# Patient Record
Sex: FEMALE | Race: WHITE
[De-identification: names, ages, dates, MRNs, and addresses within clinical notes are randomized per-mention and may not be internally consistent; named-entity substitution may affect disease eponyms.]

---

## 2017-02-15 ENCOUNTER — HOSPITAL ENCOUNTER (OUTPATIENT)
Dept: HOSPITAL 75 - PREOP | Age: 27
End: 2017-02-15
Attending: OBSTETRICS & GYNECOLOGY
Payer: COMMERCIAL

## 2017-02-15 VITALS — BODY MASS INDEX: 21.71 KG/M2 | WEIGHT: 115 LBS | HEIGHT: 61 IN

## 2017-02-15 DIAGNOSIS — Z01.818: Primary | ICD-10-CM

## 2017-02-15 DIAGNOSIS — Z30.432: ICD-10-CM

## 2017-02-16 ENCOUNTER — HOSPITAL ENCOUNTER (OUTPATIENT)
Dept: HOSPITAL 75 - SDC | Age: 27
Discharge: HOME | End: 2017-02-16
Attending: OBSTETRICS & GYNECOLOGY
Payer: COMMERCIAL

## 2017-02-16 VITALS — SYSTOLIC BLOOD PRESSURE: 101 MMHG | DIASTOLIC BLOOD PRESSURE: 67 MMHG

## 2017-02-16 VITALS — WEIGHT: 115 LBS | BODY MASS INDEX: 21.71 KG/M2 | HEIGHT: 61 IN

## 2017-02-16 VITALS — DIASTOLIC BLOOD PRESSURE: 69 MMHG | SYSTOLIC BLOOD PRESSURE: 97 MMHG

## 2017-02-16 VITALS — DIASTOLIC BLOOD PRESSURE: 67 MMHG | SYSTOLIC BLOOD PRESSURE: 116 MMHG

## 2017-02-16 DIAGNOSIS — Q51.3: ICD-10-CM

## 2017-02-16 DIAGNOSIS — Z30.432: Primary | ICD-10-CM

## 2017-02-16 LAB
BASOPHILS # BLD AUTO: 0 10^3/UL (ref 0–0.1)
BASOPHILS NFR BLD AUTO: 1 % (ref 0–10)
EOSINOPHIL # BLD AUTO: 0.2 10^3/UL (ref 0–0.3)
EOSINOPHIL NFR BLD AUTO: 3 % (ref 0–10)
ERYTHROCYTE [DISTWIDTH] IN BLOOD BY AUTOMATED COUNT: 12.2 % (ref 10–14.5)
LYMPHOCYTES # BLD AUTO: 1.7 X 10^3 (ref 1–4)
LYMPHOCYTES NFR BLD AUTO: 26 % (ref 12–44)
MCH RBC QN AUTO: 32 PG (ref 25–34)
MCHC RBC AUTO-ENTMCNC: 36 G/DL (ref 32–36)
MCV RBC AUTO: 88 FL (ref 80–99)
MONOCYTES # BLD AUTO: 0.4 X 10^3 (ref 0–1)
MONOCYTES NFR BLD AUTO: 6 % (ref 0–12)
NEUTROPHILS # BLD AUTO: 4.2 X 10^3 (ref 1.8–7.8)
NEUTROPHILS NFR BLD AUTO: 65 % (ref 42–75)
PLATELET # BLD: 317 10^3/UL (ref 130–400)
PMV BLD AUTO: 9.6 FL (ref 7.4–10.4)
RBC # BLD AUTO: 5 10^6/UL (ref 4.35–5.85)
WBC # BLD AUTO: 6.5 10^3/UL (ref 4.3–11)

## 2017-02-16 PROCEDURE — 87081 CULTURE SCREEN ONLY: CPT

## 2017-02-16 PROCEDURE — 86900 BLOOD TYPING SEROLOGIC ABO: CPT

## 2017-02-16 PROCEDURE — 84703 CHORIONIC GONADOTROPIN ASSAY: CPT

## 2017-02-16 PROCEDURE — 36415 COLL VENOUS BLD VENIPUNCTURE: CPT

## 2017-02-16 PROCEDURE — 86850 RBC ANTIBODY SCREEN: CPT

## 2017-02-16 PROCEDURE — 86901 BLOOD TYPING SEROLOGIC RH(D): CPT

## 2017-02-16 PROCEDURE — 85025 COMPLETE CBC W/AUTO DIFF WBC: CPT

## 2017-02-16 NOTE — DISCHARGE INST-WOMEN'S SERVICE
Discharge Inst-Women's Serv


Depart Medication/Instructions


New, Converted or Re-Newed RX:  RX on Chart





Consults/Follow Up


Additional Follow Up:  Yes


Orders/Referrals


Dr. Baldwin in 3 weeks





Activity


Driving Instructions:  You May Drive


NO SMOKING:  NO SMOKING


Nothing Inside Vagina:  No Douching, No [place], No Tampons


Other Activity


nothing in vagina for 2 days





Diet


Discharge Diet:  No Restrictions


Symptoms to Report to :  Bleeding Excessive, Pain Increased, Fever Over 101 

Degrees F, Vaginal Bleeding Increase, Questions/Concerns


For Any Problems or Questions:  Contact Your Physician








YEYO BALDWIN DO Feb 16, 2017 09:06

## 2017-02-16 NOTE — PROGRESS NOTE-PRE OPERATIVE
Pre-Operative Progress Note


H&P Reviewed


The H&P was reviewed, patient examined and no changes noted.


Date H&P Reviewed:  Feb 16, 2017


Time H&P Reviewed:  08:32


Pre-Operative Diagnosis:  Retained IUD, Bicornuate uterus








YEYO BALDWIN DO Feb 16, 2017 8:32 am

## 2017-02-17 NOTE — OPERATIVE REPORT
PROCEDURE PHYSICIAN:   YEYO BALDWIN

 

DATE OF PROCEDURE:  

02/16/2017

 

PREOPERATIVE DIAGNOSIS: 

1.   Retained IUD. 

2.   Bicornate uterus. 

 

POSTOPERATIVE DIAGNOSES:

1.   Retained IUD.

2.   Bicornate uterus. 

 

PROCEDURE:

Removal of IUD under IV sedation. 

 

SURGEON:

Dr. Yeyo Baldwin. 

 

ANESTHESIA:

IV sedation. 

 

ESTIMATED BLOOD LOSS:

Minimal. 

 

URINE OUTPUT:

50 mL

 

FLUID: 

500 mL lactated ringer solution. 

 

FINDINGS:

Retained IUD and a palpable bicornate uterus on bimanual

examination. 

 

INDICATIONS FOR THE PROCEDURE:

This 26-year-old female was coming into my office earlier this

week for an annual well woman update on her care as well as

removal of her IUD as she is planning for pregnancy in the near

future. Upon attempt to remove the IUD, it was very difficult and

uncomfortable for the patient and it was not coming with gentle

pulling on the strings.  Therefore, I discussed with the patient

doing this under IV sedation. The risk of the procedures was

discussed with the patient in detail including risk of

anesthesia. After all of her questions were answered she is

scheduled later this week on Thursday.  In the preoperative area

it was once again reviewed with her mother and  present

the procedure in detail and the possible use of hysteroscopy.

Once the consent was obtained, the patient was taken to the

operating room. 

 

OPERATIVE REPORT IN DETAIL:  

Once in the operating room, IV sedation was found to be adequate.

She was placed in dorsal lithotomy position, prepped and draped

in the normal sterile fashion. A weighted speculum was inserted

in the patient's vagina and right angle retractor is used to

visualize the cervix.  It was grasped at the 12 o'clock position

using long Allis clamp. I perform a paracervical block at 3 and 9

o'clock position using 0.25% Marcaine. A total of 10 mL of are

used, 5 at each injection site. I make injection site hemostatic

using silver nitrate.  I then grasp the IUD strings and with

gentle pressure I am not able to dislodge the IUD therefore, I do

take a narrow Estrella and tease my way into the cervix to find the

tip of the IUD. Once I find the tip of the IUD, easily removed by

grasping the tip of the IUD and pulling it straight out. There is

no active bleeding noted from the uterus afterwards.  All the

instruments were then removed from the patient's vagina. Straight

catheterization is used to drain the bladder, the patient

tolerated the procedure well and was taken to the recovery area

in stable condition.  

 

 

 

Job ID: [id number]

Dictated Date: 02/16/2017 09:28:18 

Transcription Date: 02/17/2017 11:16:01 / yeimi

## 2017-11-03 NOTE — XMS REPORT
Continuity of Care Document

 Created on: 2013



MORAIMA KARIMI AVELINO

External Reference #: [id number]

: 1990

Sex: Female



Demographics







 Address  [address] [place]  [address]

 

 Home Phone  [phone]

 

 Preferred Language  Unknown

 

 Marital Status  Unknown

 

 Tenriism Affiliation  Unknown

 

 Race  Unknown

 

 Ethnic Group  Unknown





Author







 Author  [facility]

 

 Organization  [facility]

 

 Address  Unknown

 

 Phone  Unavailable







Support







 Name  Relationship  Address  Phone

 

 DARIUSZ KARIMI  Next Of Kin  [address] [place]  [address] [phone]



                                            



Insurance Providers

                      





 Payer Name                    Policy Number                    Subscriber Name
                    Relationship                

 

 [employer] [facility] [employer]                    [id number]                    Moraima Karimi AVELINO                    01 Self / Same As Patient                



                                                                    



Advance Directives

                      





 Directive                    Response                    Recorded Date        
        

 

 Advance Directives                    N                    13 9:00pm    
            

 

 Organ Donor                    N                    13 9:00pm           
     



                                                                               
         



Problems

          No Known Problems or Medical conditions.                             
                                       



Family History

                      





 History                    Response                    Recorded Date/Time     
           

 

 Hx Family Cancer                    N                    13 11:43pm     
           

 

 Hx Family Breast Cancer                    N                    13 11:
43pm                

 

 Hx Family Lung Cancer                    N                    13 11:43pm
                

 

 Hx Family Colorectal Cancer                    N                    13 11
:43pm                

 

 Hx Family Cardiac Disorders                    Y                    13 11
:43pm                

 

 Hx Family Myocardial Infarction                    Y MGM                     11:43pm                



                                                                    



Social History

                      





 History                    Response                    Recorded Date/Time     
           

 

 Alcohol Use                    Denies Use                    13 11:41pm 
               

 

 Recreational Drug Use                    N                    13 11:41pm
                

 

 Recent Foreign Travel                    N                    13 11:41pm
                

 

 Recent Infectious Disease Exposure                    N                     11:41pm                

 

 Hospitalization with Isolation                    Denies                     5:47pm                

 

 Sexually Transmitted Disease                    N                    13 
11:41pm                



                                                                               
         



Allergies, Adverse Reactions, Alerts

                      





 Allergen                    Type                    Severity                  
  Reaction                    Last Updated                

 

 No Known Drug Allergies                                                       
                            13                



                                                                               
                   



Medications

                      





 Medication                    Dose                    Units                    
Route                    Sig                    Qty                    Days    
            

 

 Acetaminophen/Hydrocodone Bitart (Lorcet 5/325 Mg)                    1       
             Tab                    PO                    Q4H PRN              
      15                                     

 

 Ferrous Sulfate (Feosol Tab)                    325                    Mg     
               PO                    DAILY                    30               
                      

 

 Benzocaine/Menthol (Dermoplast Spray)                    56                    
Ml                    TP                    UD PRN                    1        
                             

 

 Docusate Sodium (Colace Cap)                    100                    Mg     
               PO                    BID                    20                 
                    

 

 Ibuprofen (Motrin)                    600                    Mg               
     PO                    Q6H                    40                           
          



                                                                               
                                       



Immunizations

                      





 Name                    Given                    Type                

 

 MMR                    13                    A                

 

 Tdap                    13                    A                

 

 influenza, split (incl. purified surface antigen)                    13 
                   A                

 

 MMR                    13                    A                

 

 Tdap                    13                    A                

 

 influenza, split (incl. purified surface antigen)                    13 
                   A                



                                                                               
                                                 



Pregnancy

                      





 Response                    Recorded Date/Time                

 

 Status not known                    Unknown                



                                                                              



Results

          No Known Relevant Diagnostic Tests, Laboratory Data and/or Discharge 
Summary.                                                                    



Encounters

                      





 Encounter                    Location                    Date/Time            
    

 

 Discharged Inpatient                    [facility]                     9:44pm
complains of pain/discomfort

## 2018-10-11 ENCOUNTER — HOSPITAL ENCOUNTER (OUTPATIENT)
Dept: HOSPITAL 75 - RAD | Age: 28
End: 2018-10-11
Attending: OBSTETRICS & GYNECOLOGY
Payer: COMMERCIAL

## 2018-10-11 DIAGNOSIS — Z36.89: Primary | ICD-10-CM

## 2018-10-11 DIAGNOSIS — Z3A.21: ICD-10-CM

## 2018-10-11 PROCEDURE — 76805 OB US >/= 14 WKS SNGL FETUS: CPT

## 2018-10-11 NOTE — DIAGNOSTIC IMAGING REPORT
INDICATION: Fetal survey.



TECHNIQUE: Multiple real-time grayscale images were obtained over

the gravid uterus.



COMPARISON: There are no prior studies available for comparison.



FINDINGS: There is a single live fetus in variable presentation.

Fetal heart motion was noted and a rate of 122 BPM was recorded.

There were no fetal abnormalities identified but the fetal spine

was not well visualized due to fetal lie. I would recommend that

a short-term (4-6 week) followup ultrasound exam be performed for

further study.



The fetal growth parameters are fairly uniform. The placenta is

fundal and along the uterine body on the left. There is no sign

of a previa. The amniotic fluid volume is within normal limits.

The cervix was identified and measures 4.6 cm in length.



IMPRESSION:

1. There is a single live fetus at approximately 21 weeks 2 days

gestation +/-1.5 weeks. The EDC is February 19, 2019.

2. There were no fetal abnormalities identified, although the

fetal spine was not optimally visualized. Recommendations as

above.

3. The fetal growth parameters are fairly uniform.



Biometrical measurements are as follows:

Biparietal 5.02 cm, age 21 weeks 2 days.

Head circumference 18.36 cm, age 20 weeks 6 days.

Abdominal circumference 17.18 cm, age 22 weeks 1 days.

Femur length 3.4 cm, age 20 weeks 5 days.



Sonographic estimate age: 21 weeks 2 days.

Sonographic estimated date of delivery: 2/19/2019.



Estimated Fetal Weight: 421 gm (+/- 62  gm).

LMP percentile: 51%.



Fetal heart rate: 123 beats per minute.



Fetal number: 1 of 1.



Dictated by: 



  Dictated on workstation # [id number]

## 2019-02-06 ENCOUNTER — HOSPITAL ENCOUNTER (OUTPATIENT)
Dept: HOSPITAL 75 - LDRP | Age: 29
End: 2019-02-06
Attending: OBSTETRICS & GYNECOLOGY
Payer: COMMERCIAL

## 2019-02-06 ENCOUNTER — HOSPITAL ENCOUNTER (INPATIENT)
Dept: HOSPITAL 75 - WSO | Age: 29
LOS: 2 days | Discharge: HOME | End: 2019-02-08
Attending: OBSTETRICS & GYNECOLOGY | Admitting: OBSTETRICS & GYNECOLOGY
Payer: COMMERCIAL

## 2019-02-06 VITALS — SYSTOLIC BLOOD PRESSURE: 93 MMHG | DIASTOLIC BLOOD PRESSURE: 50 MMHG

## 2019-02-06 VITALS — DIASTOLIC BLOOD PRESSURE: 58 MMHG | SYSTOLIC BLOOD PRESSURE: 101 MMHG

## 2019-02-06 VITALS — DIASTOLIC BLOOD PRESSURE: 65 MMHG | SYSTOLIC BLOOD PRESSURE: 123 MMHG

## 2019-02-06 VITALS — DIASTOLIC BLOOD PRESSURE: 90 MMHG | SYSTOLIC BLOOD PRESSURE: 143 MMHG

## 2019-02-06 VITALS — WEIGHT: 145.25 LBS | BODY MASS INDEX: 27.42 KG/M2 | HEIGHT: 61 IN

## 2019-02-06 VITALS — SYSTOLIC BLOOD PRESSURE: 106 MMHG | DIASTOLIC BLOOD PRESSURE: 67 MMHG

## 2019-02-06 VITALS — DIASTOLIC BLOOD PRESSURE: 52 MMHG | SYSTOLIC BLOOD PRESSURE: 105 MMHG

## 2019-02-06 VITALS — DIASTOLIC BLOOD PRESSURE: 73 MMHG | SYSTOLIC BLOOD PRESSURE: 136 MMHG

## 2019-02-06 VITALS — SYSTOLIC BLOOD PRESSURE: 124 MMHG | DIASTOLIC BLOOD PRESSURE: 78 MMHG

## 2019-02-06 VITALS — SYSTOLIC BLOOD PRESSURE: 134 MMHG | DIASTOLIC BLOOD PRESSURE: 85 MMHG

## 2019-02-06 VITALS — DIASTOLIC BLOOD PRESSURE: 75 MMHG | SYSTOLIC BLOOD PRESSURE: 110 MMHG

## 2019-02-06 VITALS — DIASTOLIC BLOOD PRESSURE: 57 MMHG | SYSTOLIC BLOOD PRESSURE: 109 MMHG

## 2019-02-06 VITALS — SYSTOLIC BLOOD PRESSURE: 109 MMHG | DIASTOLIC BLOOD PRESSURE: 54 MMHG

## 2019-02-06 VITALS — DIASTOLIC BLOOD PRESSURE: 80 MMHG | SYSTOLIC BLOOD PRESSURE: 133 MMHG

## 2019-02-06 VITALS — SYSTOLIC BLOOD PRESSURE: 117 MMHG | DIASTOLIC BLOOD PRESSURE: 70 MMHG

## 2019-02-06 VITALS
DIASTOLIC BLOOD PRESSURE: 77 MMHG | SYSTOLIC BLOOD PRESSURE: 122 MMHG | HEIGHT: 61 IN | BODY MASS INDEX: 27.19 KG/M2 | WEIGHT: 144.02 LBS

## 2019-02-06 VITALS — DIASTOLIC BLOOD PRESSURE: 65 MMHG | SYSTOLIC BLOOD PRESSURE: 107 MMHG

## 2019-02-06 VITALS — DIASTOLIC BLOOD PRESSURE: 59 MMHG | SYSTOLIC BLOOD PRESSURE: 131 MMHG

## 2019-02-06 VITALS — DIASTOLIC BLOOD PRESSURE: 78 MMHG | SYSTOLIC BLOOD PRESSURE: 122 MMHG

## 2019-02-06 VITALS — DIASTOLIC BLOOD PRESSURE: 59 MMHG | SYSTOLIC BLOOD PRESSURE: 112 MMHG

## 2019-02-06 VITALS — SYSTOLIC BLOOD PRESSURE: 124 MMHG | DIASTOLIC BLOOD PRESSURE: 71 MMHG

## 2019-02-06 VITALS — DIASTOLIC BLOOD PRESSURE: 67 MMHG | SYSTOLIC BLOOD PRESSURE: 112 MMHG

## 2019-02-06 VITALS — DIASTOLIC BLOOD PRESSURE: 60 MMHG | SYSTOLIC BLOOD PRESSURE: 125 MMHG

## 2019-02-06 VITALS — SYSTOLIC BLOOD PRESSURE: 115 MMHG | DIASTOLIC BLOOD PRESSURE: 78 MMHG

## 2019-02-06 DIAGNOSIS — O34.03: Primary | ICD-10-CM

## 2019-02-06 DIAGNOSIS — O47.1: Primary | ICD-10-CM

## 2019-02-06 DIAGNOSIS — O43.123: ICD-10-CM

## 2019-02-06 DIAGNOSIS — Z3A.38: ICD-10-CM

## 2019-02-06 DIAGNOSIS — Z87.42: ICD-10-CM

## 2019-02-06 LAB
AMORPH SED URNS QL MICRO: (no result) /LPF
APTT PPP: YELLOW S
APTT PPP: YELLOW S
BACTERIA #/AREA URNS HPF: (no result) /HPF
BACTERIA #/AREA URNS HPF: (no result) /HPF
BASOPHILS # BLD AUTO: 0 10^3/UL (ref 0–0.1)
BASOPHILS NFR BLD AUTO: 0 % (ref 0–10)
BILIRUB UR QL STRIP: NEGATIVE
BILIRUB UR QL STRIP: NEGATIVE
EOSINOPHIL # BLD AUTO: 0 10^3/UL (ref 0–0.3)
EOSINOPHIL NFR BLD AUTO: 0 % (ref 0–10)
ERYTHROCYTE [DISTWIDTH] IN BLOOD BY AUTOMATED COUNT: 12.6 % (ref 10–14.5)
FIBRINOGEN PPP-MCNC: CLEAR MG/DL
FIBRINOGEN PPP-MCNC: CLEAR MG/DL
GLUCOSE UR STRIP-MCNC: NEGATIVE MG/DL
GLUCOSE UR STRIP-MCNC: NEGATIVE MG/DL
HCT VFR BLD CALC: 38 % (ref 35–52)
HGB BLD-MCNC: 13.2 G/DL (ref 11.5–16)
KETONES UR QL STRIP: NEGATIVE
KETONES UR QL STRIP: NEGATIVE
LEUKOCYTE ESTERASE UR QL STRIP: (no result)
LEUKOCYTE ESTERASE UR QL STRIP: (no result)
LYMPHOCYTES # BLD AUTO: 1.3 X 10^3 (ref 1–4)
LYMPHOCYTES NFR BLD AUTO: 10 % (ref 12–44)
MANUAL DIFFERENTIAL PERFORMED BLD QL: NO
MCH RBC QN AUTO: 30 PG (ref 25–34)
MCHC RBC AUTO-ENTMCNC: 35 G/DL (ref 32–36)
MCV RBC AUTO: 86 FL (ref 80–99)
MONOCYTES # BLD AUTO: 1 X 10^3 (ref 0–1)
MONOCYTES NFR BLD AUTO: 7 % (ref 0–12)
NEUTROPHILS # BLD AUTO: 10.9 X 10^3 (ref 1.8–7.8)
NEUTROPHILS NFR BLD AUTO: 82 % (ref 42–75)
NITRITE UR QL STRIP: NEGATIVE
NITRITE UR QL STRIP: NEGATIVE
PH UR STRIP: 7 [PH] (ref 5–9)
PH UR STRIP: 7 [PH] (ref 5–9)
PLATELET # BLD: 230 10^3/UL (ref 130–400)
PMV BLD AUTO: 10.4 FL (ref 7.4–10.4)
PROT UR QL STRIP: NEGATIVE
PROT UR QL STRIP: NEGATIVE
RBC #/AREA URNS HPF: (no result) /HPF
RBC #/AREA URNS HPF: (no result) /HPF
SP GR UR STRIP: 1.01 (ref 1.02–1.02)
SP GR UR STRIP: 1.01 (ref 1.02–1.02)
SQUAMOUS #/AREA URNS HPF: (no result) /HPF
SQUAMOUS #/AREA URNS HPF: (no result) /HPF
UROBILINOGEN UR-MCNC: NORMAL MG/DL
UROBILINOGEN UR-MCNC: NORMAL MG/DL
WBC # BLD AUTO: 13.2 10^3/UL (ref 4.3–11)
WBC #/AREA URNS HPF: (no result) /HPF
WBC #/AREA URNS HPF: (no result) /HPF

## 2019-02-06 PROCEDURE — 99212 OFFICE O/P EST SF 10 MIN: CPT

## 2019-02-06 PROCEDURE — 85025 COMPLETE CBC W/AUTO DIFF WBC: CPT

## 2019-02-06 PROCEDURE — 81000 URINALYSIS NONAUTO W/SCOPE: CPT

## 2019-02-06 PROCEDURE — 90686 IIV4 VACC NO PRSV 0.5 ML IM: CPT

## 2019-02-06 PROCEDURE — 86850 RBC ANTIBODY SCREEN: CPT

## 2019-02-06 PROCEDURE — 36415 COLL VENOUS BLD VENIPUNCTURE: CPT

## 2019-02-06 PROCEDURE — 87088 URINE BACTERIA CULTURE: CPT

## 2019-02-06 PROCEDURE — 99213 OFFICE O/P EST LOW 20 MIN: CPT

## 2019-02-06 PROCEDURE — 86900 BLOOD TYPING SEROLOGIC ABO: CPT

## 2019-02-06 PROCEDURE — 86901 BLOOD TYPING SEROLOGIC RH(D): CPT

## 2019-02-06 PROCEDURE — 90715 TDAP VACCINE 7 YRS/> IM: CPT

## 2019-02-06 PROCEDURE — 90471 IMMUNIZATION ADMIN: CPT

## 2019-02-06 RX ADMIN — Medication SCH MLS/HR: at 19:06

## 2019-02-06 RX ADMIN — IBUPROFEN SCH MG: 600 TABLET ORAL at 21:37

## 2019-02-06 RX ADMIN — DOCUSATE SODIUM SCH MG: 100 CAPSULE ORAL at 21:37

## 2019-02-06 RX ADMIN — Medication SCH MLS/HR: at 18:32

## 2019-02-06 NOTE — OB LABOR & DELIVERY RECORD
L&D History


Date of Service


Date of Service:  2019





History


Expected Date of Delivery:  2019


Gestational Age in Weeks:  38


Hx :  2


Hx Para:  1





Pregnancy Complications


Prenatal Events:  Routine Prenatal care


Operative Indications (Ces[place]:  N/A-Vaginal Delivery


Intrapartal Events:  None





L&D Stage1


Stage One


Onset of Labor - Date:  2019





Monitors and Tracing


Fetal Monitor Mode:  External


Fetal Monitor Accelerations:  Uniform


Fetal Monitor Decelerations:  Variable


Fetal Station:  -1


Long Term Variability:  Average (6-10)


Short Term Variability:  Present


Fetal Presentation:  Vertex





Rupture of Membranes


Spontaneous Ruture of Membrane:  No


Amniotic Membrane Rupture Time:  15:30


Amniotic Membrane Fluid Desc.:  Clear


Vaginal Bleeding Description:  Normal Show





Progress/Notes


Patient received a spinal dose due to 8 cm dilatation after AROM was performed.

  She progressed from there without further augmentation to complete and +1 

station.





L&D Stage2


Stage Two


Stage II Date:  2019





Monitors and Tracing


Fetal Monitor Mode:  External


Fetal Monitor Accelerations:  None


Fetal Monitor Decelerations:  Variable


Long Term Variability:  Average (6-10)


Short Term Variability:  Present


Fetal Position:  Right Occiput Anterior


Fetal Presentation:  Vertex





Signs of Fetal Distress by FHT


Signs of Distress


repetitive variable decels in to the 60s





Cord Descript/Complications


Fetal Cord Vessel Description:  3 Vessels





Delivery Type


Infant Delivery Method:  Low Vacuum Extraction


Anterior Shoulder:  Right





Episiotomy/Perineal Laceration


Laceraction(s)/Extensions:  Yes


Episiotomy Description:  Right Mediolateral


Sutures Used:  Vicryl


Degree (describe repair)


Due to maternal exhaustion and inability to progress vertex past +2-+3 station, 

kiwi vacuum extractor used for low extraction.  Perineum infiltrated using .25% 

marcaine with epi, and RML performed.  Suction cup placed down mid sagital 

suture line, and with next maternal ctx, pressure increased to 500 mmHG, and 

with maternal push infant head extended for delivery.  Nuchal cord reduced x 1.

  Anterior/ posterior shoulders delivered and the remainder the infant is 

easily delivered and placed on maternal abdomen.  RML repaired in usual fashion 

using 3-0 and 2-0 vicryl suture.





Condition of Infant


Delivery


1 minute Apgar Comment:  


8


5 minute Apgar Comment:  


9


Notes


live female infant weight 5lbs 6 oz.





Condition of Infant


Condition of Infant:  Living


Exam:  No Observed Abnormalities





Resuscitation


Resuscitation:  N/A - Spontaneous Resp





L&D Stage3


Pictocin


Suspected villamentous cord insertion due to gentle traction on the cord 

evulsing the cord.  Placenta manually extracted 30 mu wide open of pitocin given

, and 0.2 mg of Methergine given IM for mild uterine atony.





Placenta Delivery


Placenta Delivery:  Manual





Delivery Summary


Summary


Estimated blood loss (mL):  350


Attending at delivery:


Yeyo Baldwin DO





Condition of Delivery


Examined:  Cervix Examined, Uterus Explored


Post Partum Hemorrhage:  No


Condition of Mother


stable


Condition of Infant (s)


stable











YEYO BALDWIN DO 2019 18:54

## 2019-02-06 NOTE — HISTORY & PHYSICAL-OB
OB - Chief Complaint & HPI


Date/Time


Date of Admission:


Date of Admission:


Date seen by a Provider:  2019


Time Seen by a Provider:  15:00





Chief Complaint/History


OB-Reason for Admission/Chief:  Onset of Labor


Hx :  2


Hx Para:  1


Expected Date of Delivery:  2019


Gestational Age in Weeks:  38


Gestational Age in Days:  1


Admission Nurse Assessment Rev:  Yes


History of Labs


O pos


Antibody neg


RI 


RPR NR


HBsAg NR


HIV NR


GC neg


GBS neg





Allergies and Home Medications


Allergies


Coded Allergies:  


     No Known Drug Allergies (Unverified , 13)





Home Medications


Prenatal No.137/Iron/Folic Acd 1 Each Tablet, 1 EACH PO DAILY, (Reported)





Patient Home Medication List


Home Medication List Reviewed:  Yes





OB - History


Hx of Present Pregnancy


Prenatal Care:  Yes


Ultrasounds:  Normal mid trimester US


Obstetrical Complications:  None


Medical Complications:  None





Obstetrical History


Hx Pregnancy Termination:  No


Hx Multiple Gestation:  No


Hx Stillbirth:  No


Hx Pregnancy Complication:  No


Hx Pregnancy Induced Hypertens:  No


Hx Maternal Gestational Diabet:  No





Delivery History


Hx Dystocia:  No


Hx Large For Gestational Age I:  No


Hx Small for Gestational Age I:  No


Hx  Section:  No


Hx Vaginal Delivery Post C-Sec:  No


Hx Blood Disorders:  No


Adverse Rxn to Tranfusion:  No





Patient Past Medical History





n/a





Social History/Family History


Sexually Transmitted Disease:  No





Immunizations


Hepatitis A:  No


Hepatitis B:  No


Tetanus Booster (TDap):  Unknown





OB - Admission Exam


Physical Exam


HEENT:  NCAT


Heart:  Rhythm Normal


Lungs:  Clear


Abdomen:  Gravid


Extremities:  Normal


Reflexes:  Normal


Cervical Dilatation:  6cm


Effacement:  75%


Station:  -1


Membranes:  Intact


Fetal Heart Rate:  130's


Accelerations:  Accelerations Present


Decelerations:  Variable Decelerations


Short Term Variability:  Present


Long Term Variability:  Average (6-25)


Contractions on Admission:  < 5 Minutes Apart


Intensity:  Firm


Labs





Laboratory Tests








Test


 19


15:03 Range/Units


 


 


White Blood Count


 13.2 H


 4.3-11.0


10^3/uL


 


Red Blood Count


 4.34 L


 4.35-5.85


10^6/uL


 


Hemoglobin 13.2  11.5-16.0  G/DL


 


Hematocrit 38  35-52  %


 


Mean Corpuscular Volume 86  80-99  FL


 


Mean Corpuscular Hemoglobin 30  25-34  PG


 


Mean Corpuscular Hemoglobin


Concent 35 


 32-36  G/DL





 


Red Cell Distribution Width 12.6  10.0-14.5  %


 


Platelet Count


 230 


 130-400


10^3/uL


 


Mean Platelet Volume 10.4  7.4-10.4  FL


 


Neutrophils (%) (Auto) 82 H 42-75  %


 


Lymphocytes (%) (Auto) 10 L 12-44  %


 


Monocytes (%) (Auto) 7  0-12  %


 


Eosinophils (%) (Auto) 0  0-10  %


 


Basophils (%) (Auto) 0  0-10  %


 


Neutrophils # (Auto) 10.9 H 1.8-7.8  X 10^3


 


Lymphocytes # (Auto) 1.3  1.0-4.0  X 10^3


 


Monocytes # (Auto) 1.0  0.0-1.0  X 10^3


 


Eosinophils # (Auto)


 0.0 


 0.0-0.3


10^3/uL


 


Basophils # (Auto)


 0.0 


 0.0-0.1


10^3/uL











OB - Assessment/Plan/Diagnosis


Assessment


Assessment:  active labor


Admission Dx


27 yo  @ 38 weeks


Active labor


GBS neg


Bicornuate uterus


Admission Status:  Inpatient Order (span 2 midnights)


Reason for Inpatient Admission:  


Active labor





Plan


Plan:  Other (AROM, and analgesia)











YEYO BALDWIN DO 2019 15:34

## 2019-02-06 NOTE — NUR
#20g IV to Rt.wrist x1 attempt by this RN.  site patent, secured with opsite.  admission 
labs collected prior to IVF's infusing.  pt breathing with ctx's.

## 2019-02-06 NOTE — DISCHARGE INST-WOMEN'S SERVICE
Discharge Inst-Women's Serv


Depart Medication/Instructions


New, Converted or Re-Newed RX:  RX on Chart


Final Diagnosis


PPD 2 VAVD





Consults/Follow Up


Additional Follow Up:  Yes


Orders/Referrals


Dr. Baldwin in 6 weeks





Activity


Activity:  Activity as Tolerated


Driving Instructions:  No Driving for 1 Week


NO SMOKING:  NO SMOKING


Nothing Inside Vagina:  No Douching, No [place], No Tampons





Diet


Discharge Diet:  No Restrictions


Symptoms to Report to :  Bleeding Excessive, Pain Increased, Fever Over 101 

Degrees F, Vaginal Bleeding Increase, Questions/Concerns


For Any Problems or Questions:  Contact Your Physician











YEYO BALDWIN DO Feb 6, 2019 18:56

## 2019-02-06 NOTE — NUR
MORAIMA KARIMI presented to unit via ambulation from home, accompanied by , with 
c/o CONTRACTIONS. MORAIMA KARIMI weighed, gowned, voided, and to bed.  EFHM and TOCO 
applied, VS taken.  MORAIMA KARIMI oriented to bed controls, call light, TV, heat, and A/C 
controls.

## 2019-02-06 NOTE — NUR
here. monitor tracing reviewed. SVE 8cm.  AROM.  moderate amount clear fluid 
noted. narciso-care offered.

## 2019-02-06 NOTE — NUR
Pt. discharged home on labor precautions and instructed when she should return. Discharge 
instructions given and explained. Pt. verbalized understanding, signature to verify. Pt. 
ambulated off unit with all personal belongings in possession, accompanied by .

## 2019-02-07 VITALS — SYSTOLIC BLOOD PRESSURE: 103 MMHG | DIASTOLIC BLOOD PRESSURE: 62 MMHG

## 2019-02-07 VITALS — SYSTOLIC BLOOD PRESSURE: 106 MMHG | DIASTOLIC BLOOD PRESSURE: 57 MMHG

## 2019-02-07 VITALS — DIASTOLIC BLOOD PRESSURE: 63 MMHG | SYSTOLIC BLOOD PRESSURE: 99 MMHG

## 2019-02-07 VITALS — SYSTOLIC BLOOD PRESSURE: 102 MMHG | DIASTOLIC BLOOD PRESSURE: 62 MMHG

## 2019-02-07 VITALS — DIASTOLIC BLOOD PRESSURE: 70 MMHG | SYSTOLIC BLOOD PRESSURE: 102 MMHG

## 2019-02-07 VITALS — SYSTOLIC BLOOD PRESSURE: 101 MMHG | DIASTOLIC BLOOD PRESSURE: 60 MMHG

## 2019-02-07 LAB
BASOPHILS # BLD AUTO: 0 10^3/UL (ref 0–0.1)
BASOPHILS NFR BLD AUTO: 0 % (ref 0–10)
EOSINOPHIL # BLD AUTO: 0 10^3/UL (ref 0–0.3)
EOSINOPHIL NFR BLD AUTO: 0 % (ref 0–10)
ERYTHROCYTE [DISTWIDTH] IN BLOOD BY AUTOMATED COUNT: 12.7 % (ref 10–14.5)
HCT VFR BLD CALC: 34 % (ref 35–52)
HGB BLD-MCNC: 11.8 G/DL (ref 11.5–16)
LYMPHOCYTES # BLD AUTO: 1.7 X 10^3 (ref 1–4)
LYMPHOCYTES NFR BLD AUTO: 10 % (ref 12–44)
MANUAL DIFFERENTIAL PERFORMED BLD QL: NO
MCH RBC QN AUTO: 30 PG (ref 25–34)
MCHC RBC AUTO-ENTMCNC: 35 G/DL (ref 32–36)
MCV RBC AUTO: 88 FL (ref 80–99)
MONOCYTES # BLD AUTO: 1.5 X 10^3 (ref 0–1)
MONOCYTES NFR BLD AUTO: 9 % (ref 0–12)
NEUTROPHILS # BLD AUTO: 14.3 X 10^3 (ref 1.8–7.8)
NEUTROPHILS NFR BLD AUTO: 81 % (ref 42–75)
PLATELET # BLD: 248 10^3/UL (ref 130–400)
PMV BLD AUTO: 10.4 FL (ref 7.4–10.4)
WBC # BLD AUTO: 17.6 10^3/UL (ref 4.3–11)

## 2019-02-07 RX ADMIN — HYDROCODONE BITARTRATE AND ACETAMINOPHEN PRN TAB: 5; 325 TABLET ORAL at 02:52

## 2019-02-07 RX ADMIN — DOCUSATE SODIUM SCH MG: 100 CAPSULE ORAL at 20:41

## 2019-02-07 RX ADMIN — FERROUS SULFATE TAB 325 MG (65 MG ELEMENTAL FE) SCH MG: 325 (65 FE) TAB at 08:11

## 2019-02-07 RX ADMIN — HYDROCODONE BITARTRATE AND ACETAMINOPHEN PRN TAB: 5; 325 TABLET ORAL at 15:00

## 2019-02-07 RX ADMIN — IBUPROFEN SCH MG: 600 TABLET ORAL at 18:07

## 2019-02-07 RX ADMIN — DOCUSATE SODIUM SCH MG: 100 CAPSULE ORAL at 08:11

## 2019-02-07 RX ADMIN — IBUPROFEN SCH MG: 600 TABLET ORAL at 10:14

## 2019-02-07 RX ADMIN — IBUPROFEN SCH MG: 600 TABLET ORAL at 04:51

## 2019-02-07 RX ADMIN — VITAMIN A ACETATE, .BETA.-CAROTENE, ASCORBIC ACID, CHOLECALCIFEROL, .ALPHA.-TOCOPHEROL ACETATE, DL-, THIAMINE MONONITRATE, RIBOFLAVIN, NIACINAMIDE, PYRIDOXINE HYDROCHLORIDE, FOLIC ACID, CYANOCOBALAMIN, CALCIUM CARBONATE, FERROUS FUMARATE, ZINC OXIDE, AND CUPRIC OXIDE SCH EA: 2000; 2000; 120; 400; 22; 1.84; 3; 20; 10; 1; 12; 200; 27; 25; 2 TABLET ORAL at 08:12

## 2019-02-07 RX ADMIN — IBUPROFEN SCH MG: 600 TABLET ORAL at 16:13

## 2019-02-07 RX ADMIN — HYDROCODONE BITARTRATE AND ACETAMINOPHEN PRN TAB: 5; 325 TABLET ORAL at 08:11

## 2019-02-07 NOTE — NUR
THIS RN INTRODUCES SELF TO PT AND SO AT THIS TIME. PT ON HANDS & KNEES IN BED, TRYING TO GET 
COMFORTBALE SHE STATES. CO PAIN IN BOTTOM. THIS RN SUGGESTS USE OF DERMOPLAST SPRAY, TUCKS 
PADS, &/OR ICE. PT VERBALIZES UNDERSTANDING. RATING PAIN 7/10, REQUESTS PAIN MEDS FOR 
RELIEF. THIS RN ADMINS MEDS. PHYSICAL ASSESSMENT COMPLETE, VSS. PT DENIES NEEDS OR CONCERNS 
AT THIS TIME. CALL LIGHT WITHIN REACH.

## 2019-02-07 NOTE — POSTPARTUM PROGRESS NOTE
Postpartum Note


Postpartum Note


Postpartum Day # 1





Subjective:


Patient is without complaints. Ambulating, voiding. Tolerating a regular diet 

without nausea or vomiting. Normal lochia. Pain is well controlled with oral 

pain medications. 





Objective:








Physical Exam:


General - Alert and oriented, no apparent distress


Abdomen - Soft, appropriately tender to palpation, non-distended, fundus firm 

at umbilicus


Extremities - no edema, negative Destinee's bilaterally 








Assessment:


VAVD  











Plan:


Routine postpartum care.


Encourage breast feeding.


Encourage ambulation.


Ferrous sulfate supplementation.


Plan for discharge tomorrow





Vitals - Labs


Vital Signs - I&O





Vital Signs








  Date Time  Temp Pulse Resp B/P (MAP) Pulse Ox O2 Delivery O2 Flow Rate FiO2


 


2/7/19 08:00 97.6 78 16 102/70 (81) 98 Room Air  


 


2/7/19 04:14 98.2 76 20 99/63 (75) 96 Room Air  


 


2/7/19 00:00 98.2 71 20 106/57 (73)  Room Air  


 


2/6/19 21:00 97.9 68 20 123/65 (84)  Room Air  


 


2/6/19 20:23  76 20 117/70 (86)  Room Air  


 


2/6/19 19:53  75 20 143/90 (107)  Room Air  


 


2/6/19 19:23   20 112/67 (82)  Room Air  


 


2/6/19 19:07  80 20 101/58 (72)  Room Air  


 


2/6/19 18:55  88 20 105/52 (69)  Room Air  


 


2/6/19 18:40  78 20 112/59 (76)  Room Air  


 


2/6/19 18:25  87 20 109/54 (72)  Room Air  


 


2/6/19 18:10  102 20 125/60 (81)  Room Air  


 


2/6/19 17:55  80 20 124/78 (93)  Room Air  


 


2/6/19 17:40 97.1 81 20 115/78 (90)  Room Air  


 


2/6/19 17:25  69 20 109/57 (74)  Room Air  


 


2/6/19 17:10  68 20 93/50 (64)  Room Air  


 


2/6/19 16:55  86 20 107/65 (79)  Room Air  


 


2/6/19 16:40  77 20 106/67 (80)  Room Air  


 


2/6/19 16:20  75 20 131/59 (83)  Room Air  


 


2/6/19 16:05  73 18 136/73 (94) 98 Room Air  


 


2/6/19 16:00  67 20 122/78 (93) 99 Room Air  


 


2/6/19 15:52  71 18 134/85 (101) 99 Room Air  


 


2/6/19 15:47  71 18 133/80 (97) 100 Room Air  


 


2/6/19 15:42  72 18 124/71 (88) 98 Room Air  


 


2/6/19 14:43 97.7 81 20 122/77 (92)  Room Air  











Labs


Laboratory Tests


2/6/19 15:03: 


White Blood Count 13.2H, Red Blood Count 4.34L, Hemoglobin 13.2, Hematocrit 38, 

Mean Corpuscular Volume 86, Mean Corpuscular Hemoglobin 30, Mean Corpuscular 

Hemoglobin Concent 35, Red Cell Distribution Width 12.6, Platelet Count 230, 

Mean Platelet Volume 10.4, Neutrophils (%) (Auto) 82H, Lymphocytes (%) (Auto) 

10L, Monocytes (%) (Auto) 7, Eosinophils (%) (Auto) 0, Basophils (%) (Auto) 0, 

Neutrophils # (Auto) 10.9H, Lymphocytes # (Auto) 1.3, Monocytes # (Auto) 1.0, 

Eosinophils # (Auto) 0.0, Basophils # (Auto) 0.0


2/6/19 15:05: 


Urine Color YELLOW, Urine Clarity CLEAR, Urine pH 7, Urine Specific Gravity 

1.010L, Urine Protein NEGATIVE, Urine Glucose (UA) NEGATIVE, Urine Ketones 

NEGATIVE, Urine Nitrite NEGATIVE, Urine Bilirubin NEGATIVE, Urine Urobilinogen 

NORMAL, Urine Leukocyte Esterase 2+H, Urine RBC (Auto) 1+H, Urine RBC RARE, 

Urine WBC 5-10H, Urine Squamous Epithelial Cells 5-10, Urine Crystals PRESENTH, 

Urine Amorphous Sediment FEW SASHA PHOSPHATEH, Urine Bacteria FEWH, Urine Casts 

NONE, Urine Mucus SMALLH, Urine Culture Indicated YES


2/7/19 06:00: 


White Blood Count 17.6H, Red Blood Count 3.90L, Hemoglobin 11.8, Hematocrit 34L

, Mean Corpuscular Volume 88, Mean Corpuscular Hemoglobin 30, Mean Corpuscular 

Hemoglobin Concent 35, Red Cell Distribution Width 12.7, Platelet Count 248, 

Mean Platelet Volume 10.4, Neutrophils (%) (Auto) 81H, Lymphocytes (%) (Auto) 

10L, Monocytes (%) (Auto) 9, Eosinophils (%) (Auto) 0, Basophils (%) (Auto) 0, 

Neutrophils # (Auto) 14.3H, Lymphocytes # (Auto) 1.7, Monocytes # (Auto) 1.5H, 

Eosinophils # (Auto) 0.0, Basophils # (Auto) 0.0











YEYO BALDWIN DO Feb 7, 2019 09:04

## 2019-02-07 NOTE — ANESTHESIA-REGIONAL POST-OP
Physical Therapy Daily Progress Note    Subjective   My knee feels better. My leg was sore the day after leg press at last visit.    Objective   See Exercise, Manual, and Modality Logs for complete treatment.       Assessment/Plan  Strength continues to progress. Demonstrated improved ability to avoid knee valgus on steps. Plan to re-assess at next visit. Progress per POC.                 Manual Therapy:    0     mins  31798;  Therapeutic Exercise:    33     mins  80510;     Neuromuscular Homa:    0    mins  67870;    Therapeutic Activity:     10     mins  66506;     Gait Trainin     mins  36664;     Ultrasound:     0     mins  88767;    Work Hardening           0      mins 94954  Iontophoresis               0   mins 92910    Timed Treatment:   43   mins   Total Treatment:     48   mins    Jillian George, PT  Physical Therapist   [facility]


Patient Condition


Mental Status:  Alert, Oriented x3


Circulation:  Same as Pre-Op


Headache:  Absent


Sensation:  Full Recovery


Motor Block:  Absent





Post Op Complications


Complications


None





Follow Up Care/Instructions


Patient Instructions


None needed.





Anesthesia/Patient Condition


Patient is doing well, no complaints, stable vital signs, no apparent adverse 

anesthesia problems.   


No complications reported per nursing.











AMANDEEP ZEE CRNA Feb 7, 2019 10:16

## 2019-02-08 VITALS — SYSTOLIC BLOOD PRESSURE: 89 MMHG | DIASTOLIC BLOOD PRESSURE: 55 MMHG

## 2019-02-08 VITALS — DIASTOLIC BLOOD PRESSURE: 79 MMHG | SYSTOLIC BLOOD PRESSURE: 136 MMHG

## 2019-02-08 VITALS — SYSTOLIC BLOOD PRESSURE: 106 MMHG | DIASTOLIC BLOOD PRESSURE: 68 MMHG

## 2019-02-08 VITALS — DIASTOLIC BLOOD PRESSURE: 65 MMHG | SYSTOLIC BLOOD PRESSURE: 100 MMHG

## 2019-02-08 RX ADMIN — IBUPROFEN SCH MG: 600 TABLET ORAL at 12:24

## 2019-02-08 RX ADMIN — IBUPROFEN SCH MG: 600 TABLET ORAL at 00:10

## 2019-02-08 RX ADMIN — VITAMIN A ACETATE, .BETA.-CAROTENE, ASCORBIC ACID, CHOLECALCIFEROL, .ALPHA.-TOCOPHEROL ACETATE, DL-, THIAMINE MONONITRATE, RIBOFLAVIN, NIACINAMIDE, PYRIDOXINE HYDROCHLORIDE, FOLIC ACID, CYANOCOBALAMIN, CALCIUM CARBONATE, FERROUS FUMARATE, ZINC OXIDE, AND CUPRIC OXIDE SCH EA: 2000; 2000; 120; 400; 22; 1.84; 3; 20; 10; 1; 12; 200; 27; 25; 2 TABLET ORAL at 09:29

## 2019-02-08 RX ADMIN — DOCUSATE SODIUM SCH MG: 100 CAPSULE ORAL at 09:29

## 2019-02-08 RX ADMIN — FERROUS SULFATE TAB 325 MG (65 MG ELEMENTAL FE) SCH MG: 325 (65 FE) TAB at 09:29

## 2019-02-08 RX ADMIN — IBUPROFEN SCH MG: 600 TABLET ORAL at 05:56

## 2019-02-08 NOTE — NUR
Discharge instructions explained, signed and copy to patient.  pt verbalized understanding 
of instructions and denied questions.

prescriptions given earlier today to get filled in case pt becomes room in parent.

## 2019-02-08 NOTE — POSTPARTUM PROGRESS NOTE
Postpartum Note


Postpartum Note


Postpartum Day # 2





Subjective:


Patient is without complaints. Ambulating, voiding. Tolerating a regular diet 

without nausea or vomiting. Normal lochia. Pain is well controlled with oral 

pain medications





Objective:








Physical Exam:


General - Alert and oriented, no apparent distress


Abdomen - Soft, appropriately tender to palpation, non-distended, fundus firm 

at umbilicus


Extremities - no edema, negative Destinee's bilaterally 








Assessment:


PPD 2 VAVD











Plan:


Routine postpartum care.


Encourage breast feeding.


Encourage ambulation.


Ferrous sulfate supplementation.


Plan for discharge today





Vitals - Labs


Vital Signs - I&O





Vital Signs








  Date Time  Temp Pulse Resp B/P (MAP) Pulse Ox O2 Delivery O2 Flow Rate FiO2


 


2/8/19 05:54 97.9 82 16 89/55 (66) 97 Room Air  


 


2/8/19 00:00 98.6 78 18 106/68 (81)  Room Air  


 


2/7/19 20:00 98.2 82 16 102/62 (75)  Room Air  


 


2/7/19 16:00 97.7 67 16 103/62 (76) 99 Room Air  


 


2/7/19 12:00 98.0 73 16 101/60 (74) 96 Room Air  


 


2/7/19 08:00 97.6 78 16 102/70 (81) 98 Room Air  











Labs





Microbiology


2/6/19 Urine Culture - Final, Complete


         NO GROWTH











YEYO BALDWIN DO Feb 8, 2019 07:33

## 2019-06-21 ENCOUNTER — HOSPITAL ENCOUNTER (OUTPATIENT)
Dept: HOSPITAL 75 - RAD | Age: 29
End: 2019-06-21
Attending: OBSTETRICS & GYNECOLOGY
Payer: COMMERCIAL

## 2019-06-21 DIAGNOSIS — N89.8: Primary | ICD-10-CM

## 2019-06-21 DIAGNOSIS — Q51.3: ICD-10-CM

## 2019-06-21 DIAGNOSIS — N94.10: ICD-10-CM

## 2019-06-21 PROCEDURE — 76856 US EXAM PELVIC COMPLETE: CPT

## 2019-06-21 PROCEDURE — 76830 TRANSVAGINAL US NON-OB: CPT

## 2019-06-21 NOTE — DIAGNOSTIC IMAGING REPORT
PROCEDURE: US Non-ob pelvis comp/trans.



TECHNIQUE:  Multiple realtime grayscale images were obtained of

the pelvis in various projections endovaginally.



Transabdominal imaging was also performed.



INDICATION: Postpartum for four months with dyspareunia.



FINDINGS: The uterus measures 8.3 x 7.9 x 3.1 cm. Patient appears

to have a bicornuate uterus. Left endometrium is 10 mm in

thickness. Right endometrium is 9 mm in thickness. No myometrial

mass is detected. There is a cystic mass in the region of the

vaginal canal measuring 3.1 x 1.9 x 2.2 cm. Right ovary measures

4.0 x 2.4 x 2.1 cm and left ovary measures 3.6 x 1.8 x 1.8 cm.

The ovaries contain small follicles. There is blood flow

bilaterally. No adnexal mass or free fluid is seen.



IMPRESSION:

1. Bicornuate uterus.

2. 3.1 cm vaginal wall cyst. No other significant abnormality is

detected.



Dictated by: 



  Dictated on workstation # [id number]

## 2019-07-31 ENCOUNTER — HOSPITAL ENCOUNTER (OUTPATIENT)
Dept: HOSPITAL 75 - RAD | Age: 29
End: 2019-07-31
Attending: OBSTETRICS & GYNECOLOGY
Payer: COMMERCIAL

## 2019-07-31 DIAGNOSIS — N89.8: Primary | ICD-10-CM

## 2019-07-31 PROCEDURE — 74455 X-RAY URETHRA/BLADDER: CPT

## 2019-07-31 NOTE — DIAGNOSTIC IMAGING REPORT
Indication: Vaginal wall mass. The study is performed to evaluate

for urethral diverticulum.



A Gore catheter was inserted into the patient's bladder. Balloon

was inflated. Iodinated contrast mixed with saline was injected

in a retrograde fashion. Once adequate bladder distention was

achieved, a Gore catheter was removed. Patient then voided on

the table during rapid spot film acquisition AP and oblique

position.



A preliminary radiograph over the pelvis is unremarkable. The

bladder has a smooth contour. There appears to be a smooth walled

impression upon the bladder base which may be secondary to the

known vaginal wall mass. No abnormal accumulations of contrast

are seen to suggest urethral diverticulum. Urethra is

unremarkable.



Impression: No evidence of urethral diverticulum. There is

impression upon the bladder base by smooth-walled mass,

correlating with the cystic mass noted on pelvic ultrasound

within the vaginal wall.



Dictated by: 



  Dictated on workstation # [id number]

## 2019-08-06 ENCOUNTER — HOSPITAL ENCOUNTER (OUTPATIENT)
Dept: HOSPITAL 75 - PREOP | Age: 29
Discharge: HOME | End: 2019-08-06
Attending: OBSTETRICS & GYNECOLOGY
Payer: COMMERCIAL

## 2019-08-06 VITALS — HEIGHT: 61 IN | BODY MASS INDEX: 23.22 KG/M2 | WEIGHT: 123 LBS

## 2019-08-06 DIAGNOSIS — Z01.818: Primary | ICD-10-CM

## 2019-08-08 ENCOUNTER — HOSPITAL ENCOUNTER (OUTPATIENT)
Dept: HOSPITAL 75 - SDC | Age: 29
LOS: 1 days | Discharge: HOME | End: 2019-08-09
Attending: OBSTETRICS & GYNECOLOGY
Payer: COMMERCIAL

## 2019-08-08 VITALS — SYSTOLIC BLOOD PRESSURE: 82 MMHG | DIASTOLIC BLOOD PRESSURE: 60 MMHG

## 2019-08-08 VITALS — DIASTOLIC BLOOD PRESSURE: 62 MMHG | SYSTOLIC BLOOD PRESSURE: 112 MMHG

## 2019-08-08 VITALS — DIASTOLIC BLOOD PRESSURE: 57 MMHG | SYSTOLIC BLOOD PRESSURE: 97 MMHG

## 2019-08-08 VITALS — SYSTOLIC BLOOD PRESSURE: 83 MMHG | DIASTOLIC BLOOD PRESSURE: 54 MMHG

## 2019-08-08 VITALS — DIASTOLIC BLOOD PRESSURE: 57 MMHG | SYSTOLIC BLOOD PRESSURE: 93 MMHG

## 2019-08-08 VITALS — DIASTOLIC BLOOD PRESSURE: 58 MMHG | SYSTOLIC BLOOD PRESSURE: 88 MMHG

## 2019-08-08 VITALS — DIASTOLIC BLOOD PRESSURE: 48 MMHG | SYSTOLIC BLOOD PRESSURE: 80 MMHG

## 2019-08-08 VITALS — DIASTOLIC BLOOD PRESSURE: 53 MMHG | SYSTOLIC BLOOD PRESSURE: 90 MMHG

## 2019-08-08 VITALS — SYSTOLIC BLOOD PRESSURE: 88 MMHG | DIASTOLIC BLOOD PRESSURE: 60 MMHG

## 2019-08-08 VITALS — SYSTOLIC BLOOD PRESSURE: 88 MMHG | DIASTOLIC BLOOD PRESSURE: 56 MMHG

## 2019-08-08 VITALS — DIASTOLIC BLOOD PRESSURE: 70 MMHG | SYSTOLIC BLOOD PRESSURE: 103 MMHG

## 2019-08-08 VITALS — SYSTOLIC BLOOD PRESSURE: 90 MMHG | DIASTOLIC BLOOD PRESSURE: 47 MMHG

## 2019-08-08 VITALS — SYSTOLIC BLOOD PRESSURE: 85 MMHG | DIASTOLIC BLOOD PRESSURE: 57 MMHG

## 2019-08-08 VITALS — DIASTOLIC BLOOD PRESSURE: 57 MMHG | SYSTOLIC BLOOD PRESSURE: 88 MMHG

## 2019-08-08 VITALS — SYSTOLIC BLOOD PRESSURE: 85 MMHG | DIASTOLIC BLOOD PRESSURE: 56 MMHG

## 2019-08-08 VITALS — HEIGHT: 61 IN | BODY MASS INDEX: 23.22 KG/M2 | WEIGHT: 123 LBS

## 2019-08-08 VITALS — SYSTOLIC BLOOD PRESSURE: 93 MMHG | DIASTOLIC BLOOD PRESSURE: 58 MMHG

## 2019-08-08 VITALS — SYSTOLIC BLOOD PRESSURE: 98 MMHG | DIASTOLIC BLOOD PRESSURE: 54 MMHG

## 2019-08-08 VITALS — DIASTOLIC BLOOD PRESSURE: 46 MMHG | SYSTOLIC BLOOD PRESSURE: 85 MMHG

## 2019-08-08 VITALS — DIASTOLIC BLOOD PRESSURE: 62 MMHG | SYSTOLIC BLOOD PRESSURE: 99 MMHG

## 2019-08-08 DIAGNOSIS — T81.31XA: ICD-10-CM

## 2019-08-08 DIAGNOSIS — Z80.1: ICD-10-CM

## 2019-08-08 DIAGNOSIS — Q52.4: Primary | ICD-10-CM

## 2019-08-08 LAB
BASOPHILS # BLD AUTO: 0 10^3/UL (ref 0–0.1)
BASOPHILS NFR BLD AUTO: 1 % (ref 0–10)
EOSINOPHIL # BLD AUTO: 0.1 10^3/UL (ref 0–0.3)
EOSINOPHIL NFR BLD AUTO: 2 % (ref 0–10)
ERYTHROCYTE [DISTWIDTH] IN BLOOD BY AUTOMATED COUNT: 12 % (ref 10–14.5)
ERYTHROCYTE [DISTWIDTH] IN BLOOD BY AUTOMATED COUNT: 12.5 % (ref 10–14.5)
HCT VFR BLD CALC: 34 % (ref 35–52)
HCT VFR BLD CALC: 42 % (ref 35–52)
HGB BLD-MCNC: 11.7 G/DL (ref 11.5–16)
HGB BLD-MCNC: 14.3 G/DL (ref 11.5–16)
LYMPHOCYTES # BLD AUTO: 1.7 X 10^3 (ref 1–4)
LYMPHOCYTES NFR BLD AUTO: 33 % (ref 12–44)
MANUAL DIFFERENTIAL PERFORMED BLD QL: NO
MCH RBC QN AUTO: 30 PG (ref 25–34)
MCH RBC QN AUTO: 30 PG (ref 25–34)
MCHC RBC AUTO-ENTMCNC: 34 G/DL (ref 32–36)
MCHC RBC AUTO-ENTMCNC: 34 G/DL (ref 32–36)
MCV RBC AUTO: 88 FL (ref 80–99)
MCV RBC AUTO: 88 FL (ref 80–99)
MONOCYTES # BLD AUTO: 0.4 X 10^3 (ref 0–1)
MONOCYTES NFR BLD AUTO: 7 % (ref 0–12)
NEUTROPHILS # BLD AUTO: 2.9 X 10^3 (ref 1.8–7.8)
NEUTROPHILS NFR BLD AUTO: 58 % (ref 42–75)
PLATELET # BLD: 297 10^3/UL (ref 130–400)
PLATELET # BLD: 307 10^3/UL (ref 130–400)
PMV BLD AUTO: 9.3 FL (ref 7.4–10.4)
PMV BLD AUTO: 9.5 FL (ref 7.4–10.4)
WBC # BLD AUTO: 14.8 10^3/UL (ref 4.3–11)
WBC # BLD AUTO: 5 10^3/UL (ref 4.3–11)

## 2019-08-08 PROCEDURE — 86850 RBC ANTIBODY SCREEN: CPT

## 2019-08-08 PROCEDURE — 36415 COLL VENOUS BLD VENIPUNCTURE: CPT

## 2019-08-08 PROCEDURE — 87081 CULTURE SCREEN ONLY: CPT

## 2019-08-08 PROCEDURE — 84703 CHORIONIC GONADOTROPIN ASSAY: CPT

## 2019-08-08 PROCEDURE — 85027 COMPLETE CBC AUTOMATED: CPT

## 2019-08-08 PROCEDURE — 86901 BLOOD TYPING SEROLOGIC RH(D): CPT

## 2019-08-08 PROCEDURE — 85025 COMPLETE CBC W/AUTO DIFF WBC: CPT

## 2019-08-08 PROCEDURE — 86900 BLOOD TYPING SEROLOGIC ABO: CPT

## 2019-08-08 RX ADMIN — SODIUM CHLORIDE, SODIUM LACTATE, POTASSIUM CHLORIDE, AND CALCIUM CHLORIDE PRN MLS/HR: 600; 310; 30; 20 INJECTION, SOLUTION INTRAVENOUS at 14:30

## 2019-08-08 RX ADMIN — SODIUM CHLORIDE, SODIUM LACTATE, POTASSIUM CHLORIDE, AND CALCIUM CHLORIDE PRN MLS/HR: 600; 310; 30; 20 INJECTION, SOLUTION INTRAVENOUS at 08:57

## 2019-08-08 RX ADMIN — ONDANSETRON PRN MG: 2 INJECTION, SOLUTION INTRAMUSCULAR; INTRAVENOUS at 20:39

## 2019-08-08 RX ADMIN — ONDANSETRON PRN MG: 2 INJECTION, SOLUTION INTRAMUSCULAR; INTRAVENOUS at 12:55

## 2019-08-08 RX ADMIN — SODIUM CHLORIDE, SODIUM LACTATE, POTASSIUM CHLORIDE, CALCIUM CHLORIDE, AND DEXTROSE MONOHYDRATE SCH MLS/HR: 600; 310; 30; 20; 5 INJECTION, SOLUTION INTRAVENOUS at 13:16

## 2019-08-08 RX ADMIN — SODIUM CHLORIDE, SODIUM LACTATE, POTASSIUM CHLORIDE, CALCIUM CHLORIDE, AND DEXTROSE MONOHYDRATE SCH MLS/HR: 600; 310; 30; 20; 5 INJECTION, SOLUTION INTRAVENOUS at 23:51

## 2019-08-08 RX ADMIN — SODIUM CHLORIDE, SODIUM LACTATE, POTASSIUM CHLORIDE, AND CALCIUM CHLORIDE PRN MLS/HR: 600; 310; 30; 20 INJECTION, SOLUTION INTRAVENOUS at 10:00

## 2019-08-08 NOTE — NUR
TO ROOM TO ANSWER CALL LIGHT. PT REPORTS FEELING DIZZY, LIGHT HEADED. VS TAKEN, STABLE. VAG 
PACKING SATURATED AND VPAD AT LEAST 1/2 FULL OF BRIGHT RED BLOOD. DR. BALDWIN CALLED AND 
NOTIFIED OF BLEEDING AND PT C/O AND VS. JUST CONTINUE TO MONITOR FOR NOW.

## 2019-08-08 NOTE — PROGRESS NOTE-PRE OPERATIVE
Pre-Operative Progress Note


H&P Reviewed


The H&P was reviewed, patient examined and no changes noted.


Date Seen by Provider:  Aug 8, 2019


Time Seen by Provider:  09:00


Date H&P Reviewed:  Aug 8, 2019


Time H&P Reviewed:  09:00


Pre-Operative Diagnosis:  Anterior vaginal wall mass











YEYO BALDWIN DO             Aug 8, 2019 09:31

## 2019-08-08 NOTE — NUR
1400 DR. BALDWIN ON UNIT. TO ROOM TO SEE PT. FIRST VPAD SHOWN TO DR JULIO CESAR ASSESSING PERINEUM. 
 TO REPLACE VAG PACKING. SUPPLIES OBTAINED, 0.5MG DILAUDID GIVEN IV FOR PAIN PER VERBAL 
ORDER. ORIGINAL VAG PACKING REMOVED PER , KEPT TO WEIGH. VAG PACKING REPLACED PER , X 2 
PACKS. PT VERBALIZES PAIN AND PRESSURE BUT TOLERATES PROCEDURE WELL. 



1420 THIS RN AND A. BACK PERFORMING PERICARE AND ATTEMPTING TO CHANGE LINENS ON BED. VAG 
PACKING NOTED TO BE SATURATED AGAIN AND BLOOD DRIPPING DOWN LEG. DR. BALDWIN STILL ON UNIT, 
NOTIFIED AND TO BEDSIDE TO ASSESS.  DECIDING TO TAKE PT BACK TO OR FOR REVISION.



1428 SURGERY NOTIFIED 



1430 CONSENT OBTAINED FOR SURGERY. IV TO OR TUBING AND LR BOLUS STARTED. GREGORY CATHETER 
EMPTIED, 150ML CLEAR, YELLOW URINE OBTAINED. VS TAKEN.



1440 OR STAFF ON UNIT. REPORT GIVEN. CARES ASSUMED PER OR RNS AND PT TAKEN OFF UNIT VIA BED.

## 2019-08-08 NOTE — XMS REPORT
Continuity of Care Document

                             Created on: 2019



DAXA KARIMIIE AVELINO

External Reference #: [id number]

: 1990

Sex: Female



Demographics







                          Address                   [address] [place]  [address]

 

                          Home Phone                [phone] x

 

                          Preferred Language        Unknown

 

                          Marital Status            Unknown

 

                          Rastafarian Affiliation     Unknown

 

                          Race                      Unknown

 

                          Ethnic Group              Unknown





Author







                          Organization              Unknown

 

                          Address                   Unknown

 

                          Phone                     Unavailable



              



Allergies

      





             Active              Description              Code              Type              Severity

                Reaction              Onset              Reported/Identified              Relationship

 to Patient                             Clinical Status        

 

                Yes              No Known Drug Allergies              [id number]              Drug Allergy

              Unknown              N/A                             2013              

                                                 



                  



Medications

      



There is no data.                  



Problems

      





             Date Dx Coded              Attending              Type              Code              Diagnosis

                                        Diagnosed By        

 

                2017              FENECH DOYEYO S              Ot              Z01.818     

                          ENCOUNTER FOR OTHER PREPROCEDURAL EXAMIN                       

 

                2017              FENECH DOYEYO S              Ot              Z30.432     

                          ENCOUNTER FOR REMOVAL OF INTRAUTERINE CO                       

 

                2017              FENECH DO, YEYO S              Ot              Q51.3       

                          BICORNATE UTERUS                       

 

                2017              FENECH DO, YEYO S              Ot              Z30.432     

                          ENCOUNTER FOR REMOVAL OF INTRAUTERINE CO                       

 

                2017              FENECH DO, YEYO S              Ot              Q51.3       

                          BICORNATE UTERUS                       

 

                2017              FENECH DO, YEYO S              Ot              Z30.432     

                          ENCOUNTER FOR REMOVAL OF INTRAUTERINE CO                       

 

                10/17/2018              FENECH DO, YEYO S              Ot              Z36.89      

                          ENCOUNTER FOR OTHER SPECIFIED                         

 

                10/17/2018              FENECH DO, YEYO S              Ot              Z3A.21      

                          21 WEEKS GESTATION OF PREGNANCY                       

 

                2019              HAMMAD TRINIDAD DO              Ot              O47.1          

                          FALSE LABOR AT OR AFTER 37 COMPLETED WEE                       

 

                2019              HAMMAD TRINIDAD DO, Ot              Z3A.38         

                          38 WEEKS GESTATION OF PREGNANCY                       

 

                2019              FENECH DO, YEYO S              Ot              Z36.89      

                          ENCOUNTER FOR OTHER SPECIFIED                         

 

                2019              FENECH DO, YEYO S              Ot              Z3A.21      

                          21 WEEKS GESTATION OF PREGNANCY                       

 

                2019              FENECH DO, YEYO S              Ot              Z36.89      

                          ENCOUNTER FOR OTHER SPECIFIED                         

 

                2019              FENECH DO, YEYO S              Ot              Z3A.21      

                          21 WEEKS GESTATION OF PREGNANCY                       

 

                2019              HAMMAD TRINIDAD DO              Ot              O47.1          

                          FALSE LABOR AT OR AFTER 37 COMPLETED WEE                       

 

                2019              HAMMAD TRINIDAD DO, Ot              Z3A.38         

                          38 WEEKS GESTATION OF PREGNANCY                       

 

                2019              FENECH DO YEYO S              Ot              O34.03      

                          MATERNAL CARE FOR UNSP CONGEN MALFORM OF                       

 

                2019              YEYO BALDWIN DO              Ot              O43.123     

                          VELAMENTOUS INSERTION OF UMBILICAL CORD,                       

 

                2019              NIOCLASA JONES YEYO TURPIN              Ot              O65.5       

                          OBSTRUCTED LABOR DUE TO ABNLT OF MATERNA                       

 

                2019              NICOLASA JONES YEYO TURPIN              Ot              O69.81X0    

                          LABOR AND DEL COMP BY CORD AROUND NECK,                        

 

                2019              NICOLASA JONES YEYO TURPIN              Ot              O75.81      

                          MATERNAL EXHAUSTION COMPLICATING LABOR A                       

 

                2019              NICOLASA JONES YEYO TURPIN              Ot              O76         

                          ABNLT IN FETAL HEART RATE AND RHYTHM COM                       

 

                2019              NICOLASA JONES YEYO TURPIN              Ot              Z37.0       

                          SINGLE LIVE BIRTH                       

 

                2019              NICOLASA JONES YEYO TURPIN              Azar              Z3A.38      

                          38 WEEKS GESTATION OF PREGNANCY                       

 

                2019              NICOLASA JONES YEYO TURPIN              Ot              Z87.42      

                          PERSONAL HISTORY OF OTH DISEASES OF THE                        

 

                2019              NICOLASA JONES YEYO TURPIN              Ot              Z36.89      

                          ENCOUNTER FOR OTHER SPECIFIED                         

 

                2019              NICOLASA JONES YEYO TURPIN              Azar              Z3A.21      

                          21 WEEKS GESTATION OF PREGNANCY                       

 

                2019              NICOLASA JONES YEYO TURPIN              Ot              N89.8       

                          OTHER SPECIFIED NONINFLAMMATORY DISORDER                       

 

                2019              NICOLASA JONES YEYO TURPIN              Ot              N94.10      

                          UNSPECIFIED DYSPAREUNIA                       

 

                2019              NICOLASA JONES YEYO TURPIN              Ot              Q51.3       

                          BICORNATE UTERUS                       

 

                2019              NICOLASA JONES YEYO TURPIN              Azar              Z01.818     

                          ENCOUNTER FOR OTHER PREPROCEDURAL EXAMIN                       



                                                                              



Procedures

      





                Code              Description              Performed By              Performed On     

   

 

                                      [id number]                                    DIVISION OF FEMALE PERINEUM,

 EXTERNAL AP                                                  2019        

 

                                      [id number]                                    EXTRACTION OF PRODUCTS OF CONCEPTION,

 VA                                                  2019        

 

                                      [id number]                                    DELIVERY OF PRODUCTS OF CONCEPTION,

 EXTE                                                  2019        



                      



Results

      





                    Test                Result              Range        









                                        Urine beta human chorionic gonadotropin (hCG) measurement - 17 07:20      

   









                          Urine beta human chorionic gonadotropin (hCG) measurement              NEGATIVE   

                                        NEGATIVE        









                                        Methicillin resistant Staphylococcus aureus (MRSA) screening culture - 17 

07:20         









                          Methicillin resistant Staphylococcus aureus (MRSA) screening culture              

NEG                                     NRG        









                                        Complete blood count (CBC) with automated white blood cell (WBC) differential - 

17 07:28         









                          Blood leukocytes automated count (number/volume)              6.5 10*3/uL         

                                        4.3-11.0        

 

                          Blood erythrocytes automated count (number/volume)              5.00 10*6/uL      

                                        4.35-5.85        

 

                          Venous blood hemoglobin measurement (mass/volume)              15.8 g/dL          

                                        11.5-16.0        

 

                    Blood hematocrit (volume fraction)              44 %                35-52        

 

                    Automated erythrocyte mean corpuscular volume              88 [foz_us]              

80-99        

 

                                        Automated erythrocyte mean corpuscular hemoglobin (mass per erythrocyte)        

                          32 pg                     25-34        

 

                                        Automated erythrocyte mean corpuscular hemoglobin concentration measurement (mass/volume)

                          36 g/dL                   32-36        

 

                    Automated erythrocyte distribution width ratio              12.2 %              10.0-

14.5        

 

                    Automated blood platelet count (count/volume)              317 10*3/uL              

130-400        

 

                          Automated blood platelet mean volume measurement              9.6 [foz_us]        

                                        7.4-10.4        

 

                    Automated blood neutrophils/100 leukocytes              65 %                42-75     

   

 

                    Automated blood lymphocytes/100 leukocytes              26 %                12-44     

   

 

                    Blood monocytes/100 leukocytes              6 %                 0-12        

 

                    Automated blood eosinophils/100 leukocytes              3 %                 0-10       

 

 

                    Automated blood basophils/100 leukocytes              1 %                 0-10        

 

                          Blood neutrophils automated count (number/volume)              4.2 10*3           

                                        1.8-7.8        

 

                          Blood lymphocytes automated count (number/volume)              1.7 10*3           

                                        1.0-4.0        

 

                    Blood monocytes automated count (number/volume)              0.4 10*3              0.0-

1.0        

 

                    Automated eosinophil count              0.2 10*3/uL              0.0-0.3        

 

                    Automated blood basophil count (count/volume)              0.0 10*3/uL              

0.0-0.1        









                                        Blood type T Indirect antibody screen panel - 17 07:28         









                    ABO+Rh group              OP                  NRG        

 

                    Transfusion band number              [id number]               NRG        

 

                    Blood group antibody screen              NEGATIVE               NRG        









                                        Complete urinalysis with reflex to culture - 19 03:35         









                    Urine color determination              YELLOW               NRG        

 

                    Urine clarity determination              CLEAR               NRG        

 

                    Urine pH measurement by test strip              7                   5-9        

 

                    Specific gravity of urine by test strip              1.010               1.016-1.022

        

 

                          Urine protein assay by test strip, semi-quantitative              NEGATIVE        

                                        NEGATIVE        

 

                    Urine glucose detection by automated test strip              NEGATIVE               

NEGATIVE        

 

                          Erythrocytes detection in urine sediment by light microscopy              NEGATIVE

                                        NEGATIVE        

 

                    Urine ketones detection by automated test strip              NEGATIVE               

NEGATIVE        

 

                    Urine nitrite detection by test strip              NEGATIVE               NEGATIVE  

      

 

                    Urine total bilirubin detection by test strip              NEGATIVE               NEGATIVE

        

 

                          Urine urobilinogen measurement by automated test strip (mass/volume)              

NORMAL                                  NORMAL        

 

                    Urine leukocyte esterase detection by dipstick              2+                  NEGATIVE

        

 

                                        Automated urine sediment erythrocyte count by microscopy (number/high power field)

                          NONE                      NRG        

 

                                        Automated urine sediment leukocyte count by microscopy (number/high power field)

                           [HPF]                    NRG        

 

                          Bacteria detection in urine sediment by light microscopy              TRACE       

                                        NRG        

 

                                        Squamous epithelial cells detection in urine sediment by light microscopy       

                          5-10                      NRG        

 

                          Crystals detection in urine sediment by light microscopy              NONE        

                                        NRG        

 

                          Casts detection in urine sediment by light microscopy              NONE           

                                        NRG        

 

                          Mucus detection in urine sediment by light microscopy              NEGATIVE       

                                        NRG        

 

                    Complete urinalysis with reflex to culture              NO                  NRG        











                                        Complete blood count (CBC) with automated white blood cell (WBC) differential - 

19 15:03         









                          Blood leukocytes automated count (number/volume)              13.2 10*3/uL        

                                        4.3-11.0        

 

                          Blood erythrocytes automated count (number/volume)              4.34 10*6/uL      

                                        4.35-5.85        

 

                          Venous blood hemoglobin measurement (mass/volume)              13.2 g/dL          

                                        11.5-16.0        

 

                    Blood hematocrit (volume fraction)              38 %                35-52        

 

                    Automated erythrocyte mean corpuscular volume              86 [foz_us]              

80-99        

 

                                        Automated erythrocyte mean corpuscular hemoglobin (mass per erythrocyte)        

                          30 pg                     25-34        

 

                                        Automated erythrocyte mean corpuscular hemoglobin concentration measurement (mass/volume)

                          35 g/dL                   32-36        

 

                    Automated erythrocyte distribution width ratio              12.6 %              10.0-

14.5        

 

                    Automated blood platelet count (count/volume)              230 10*3/uL              

130-400        

 

                          Automated blood platelet mean volume measurement              10.4 [foz_us]       

                                        7.4-10.4        

 

                    Automated blood neutrophils/100 leukocytes              82 %                42-75     

   

 

                    Automated blood lymphocytes/100 leukocytes              10 %                12-44     

   

 

                    Blood monocytes/100 leukocytes              7 %                 0-12        

 

                    Automated blood eosinophils/100 leukocytes              0 %                 0-10       

 

 

                    Automated blood basophils/100 leukocytes              0 %                 0-10        

 

                          Blood neutrophils automated count (number/volume)              10.9 10*3          

                                        1.8-7.8        

 

                          Blood lymphocytes automated count (number/volume)              1.3 10*3           

                                        1.0-4.0        

 

                    Blood monocytes automated count (number/volume)              1.0 10*3              0.0-

1.0        

 

                    Automated eosinophil count              0.0 10*3/uL              0.0-0.3        

 

                    Automated blood basophil count (count/volume)              0.0 10*3/uL              

0.0-0.1        









                                        Blood type T Indirect antibody screen panel - 19 15:03         









                    ABO+Rh group              OP                  NRG        

 

                    Transfusion band number              [id number]               NRG        

 

                    Blood group antibody screen              NEGATIVE               NRG        









                                        Complete urinalysis with reflex to culture - 19 15:05         









                    Urine color determination              YELLOW               NRG        

 

                    Urine clarity determination              CLEAR               NRG        

 

                    Urine pH measurement by test strip              7                   5-9        

 

                    Specific gravity of urine by test strip              1.010               1.016-1.022

        

 

                          Urine protein assay by test strip, semi-quantitative              NEGATIVE        

                                        NEGATIVE        

 

                    Urine glucose detection by automated test strip              NEGATIVE               

NEGATIVE        

 

                          Erythrocytes detection in urine sediment by light microscopy              1+      

                                        NEGATIVE        

 

                    Urine ketones detection by automated test strip              NEGATIVE               

NEGATIVE        

 

                    Urine nitrite detection by test strip              NEGATIVE               NEGATIVE  

      

 

                    Urine total bilirubin detection by test strip              NEGATIVE               NEGATIVE

        

 

                          Urine urobilinogen measurement by automated test strip (mass/volume)              

NORMAL                                  NORMAL        

 

                    Urine leukocyte esterase detection by dipstick              2+                  NEGATIVE

        

 

                                        Automated urine sediment erythrocyte count by microscopy (number/high power field)

                          RARE                      NRG        

 

                                        Automated urine sediment leukocyte count by microscopy (number/high power field)

                           [HPF]                    NRG        

 

                          Bacteria detection in urine sediment by light microscopy              FEW         

                                        NRG        

 

                                        Squamous epithelial cells detection in urine sediment by light microscopy       

                          5-10                      NRG        

 

                          Crystals detection in urine sediment by light microscopy              PRESENT     

                                        NRG        

 

                          Casts detection in urine sediment by light microscopy              NONE           

                                        NRG        

 

                          Mucus detection in urine sediment by light microscopy              SMALL          

                                        NRG        

 

                    Complete urinalysis with reflex to culture              YES                 NRG       

 

 

                          Amorphous sediment detection in urine sediment by light microscopy              FEW

 SASHA PHOSPHATE                         NRG        









                                        Bacterial urine culture - 19 15:05         









                    Bacterial urine culture              NG                  NRG        









                                        Complete blood count (CBC) with automated white blood cell (WBC) differential - 

19 06:00         









                          Blood leukocytes automated count (number/volume)              17.6 10*3/uL        

                                        4.3-11.0        

 

                          Blood erythrocytes automated count (number/volume)              3.90 10*6/uL      

                                        4.35-5.85        

 

                          Venous blood hemoglobin measurement (mass/volume)              11.8 g/dL          

                                        11.5-16.0        

 

                    Blood hematocrit (volume fraction)              34 %                35-52        

 

                    Automated erythrocyte mean corpuscular volume              88 [foz_us]              

80-99        

 

                                        Automated erythrocyte mean corpuscular hemoglobin (mass per erythrocyte)        

                          30 pg                     25-34        

 

                                        Automated erythrocyte mean corpuscular hemoglobin concentration measurement (mass/volume)

                          35 g/dL                   32-36        

 

                    Automated erythrocyte distribution width ratio              12.7 %              10.0-

14.5        

 

                    Automated blood platelet count (count/volume)              248 10*3/uL              

130-400        

 

                          Automated blood platelet mean volume measurement              10.4 [foz_us]       

                                        7.4-10.4        

 

                    Automated blood neutrophils/100 leukocytes              81 %                42-75     

   

 

                    Automated blood lymphocytes/100 leukocytes              10 %                12-44     

   

 

                    Blood monocytes/100 leukocytes              9 %                 0-12        

 

                    Automated blood eosinophils/100 leukocytes              0 %                 0-10       

 

 

                    Automated blood basophils/100 leukocytes              0 %                 0-10        

 

                          Blood neutrophils automated count (number/volume)              14.3 10*3          

                                        1.8-7.8        

 

                          Blood lymphocytes automated count (number/volume)              1.7 10*3           

                                        1.0-4.0        

 

                    Blood monocytes automated count (number/volume)              1.5 10*3              0.0-

1.0        

 

                    Automated eosinophil count              0.0 10*3/uL              0.0-0.3        

 

                    Automated blood basophil count (count/volume)              0.0 10*3/uL              

0.0-0.1        









                                        Complete blood count (CBC) with automated white blood cell (WBC) differential - 

19 08:05         









                          Blood leukocytes automated count (number/volume)              5.0 10*3/uL         

                                        4.3-11.0        

 

                          Blood erythrocytes automated count (number/volume)              4.74 10*6/uL      

                                        4.35-5.85        

 

                          Venous blood hemoglobin measurement (mass/volume)              14.3 g/dL          

                                        11.5-16.0        

 

                    Blood hematocrit (volume fraction)              42 %                35-52        

 

                    Automated erythrocyte mean corpuscular volume              88 [foz_us]              

80-99        

 

                                        Automated erythrocyte mean corpuscular hemoglobin (mass per erythrocyte)        

                          30 pg                     25-34        

 

                                        Automated erythrocyte mean corpuscular hemoglobin concentration measurement (mass/volume)

                          34 g/dL                   32-36        

 

                    Automated erythrocyte distribution width ratio              12.5 %              10.0-

14.5        

 

                    Automated blood platelet count (count/volume)              297 10*3/uL              

130-400        

 

                          Automated blood platelet mean volume measurement              9.5 [foz_us]        

                                        7.4-10.4        

 

                    Automated blood neutrophils/100 leukocytes              58 %                42-75     

   

 

                    Automated blood lymphocytes/100 leukocytes              33 %                12-44     

   

 

                    Blood monocytes/100 leukocytes              7 %                 0-12        

 

                    Automated blood eosinophils/100 leukocytes              2 %                 0-10       

 

 

                    Automated blood basophils/100 leukocytes              1 %                 0-10        

 

                          Blood neutrophils automated count (number/volume)              2.9 10*3           

                                        1.8-7.8        

 

                          Blood lymphocytes automated count (number/volume)              1.7 10*3           

                                        1.0-4.0        

 

                    Blood monocytes automated count (number/volume)              0.4 10*3              0.0-

1.0        

 

                    Automated eosinophil count              0.1 10*3/uL              0.0-0.3        

 

                    Automated blood basophil count (count/volume)              0.0 10*3/uL              

0.0-0.1        









                                        Blood type T Indirect antibody screen panel - 19 08:05         









                    WRISTBAND NUMBER              [id number]               NRG        

 

                    ABO+Rh group              OP                  NRG        

 

                    Blood group antibody screen              NEGATIVE               NRG        



                                      



Encounters

      





                ACCT No.              Visit Date/Time              Discharge              Status      

                Pt. Type              Provider              Facility              Loc./Unit      

                                        Complaint        

 

                    [id number]              2019 05:35:00              2019 09:15:00      

                DIS              Outpatient              YEYO BALDWIN DO              [facility]              PREOP                     ANTERIOR VAGINAL WALL MASS     

   

 

                    [id number]              2019 09:48:00              2019 23:59:59      

                CLS              Outpatient              YEYO BALDWIN DO              [facility]              RAD                       VAGINAL MASS        

 

                    [id number]              2019 11:53:00              2019 23:59:59      

                CLS              Outpatient              YEYO BALDWIN DO              [facility]              RAD                       DYSPAREUNIA IN FEMALE        

 

                    [id number]              2019 14:49:00              2019 19:05:00      

                DIS              Inpatient              YEYO BALDWIN DO              [facility]              LDRP                      LABOR        

 

                    [id number]              2019 03:23:00              2019 05:35:00      

                DIS              Outpatient              HAMMAD TRINIDAD DO              [facility]              WSo                       CONTRACTIONS        

 

                    [id number]              10/11/2018 10:02:00              10/11/2018 23:59:59      

                CLS              Outpatient              YEYO BALDWIN DO              [facility]              RAD                       PREGNANT        

 

                    [id number]              2017 07:00:00              2017 10:06:00      

                DIS              Outpatient              YEYO BALDWIN DO              [facility]              SDC                       RETAINED IUD        

 

                    [id number]              02/15/2017 05:39:00              02/15/2017 11:13:00      

                DIS              Outpatient              YEYO BALDWIN DO              [facility]              PREOP                     RETAINED IUD        

 

                    [id number]              2013 21:44:00              2013 17:22:00      

           DIS              Inpatient                                                           

         

 

                [id number]              2019 07:42:00                              ACT         

                    Outpatient              YEYO BALDWIN DO              [facility]

                          [facility]C                       ANTERIOR VAGINAL WALL MASS

## 2019-08-08 NOTE — NUR
PT REPORTS STARTING TO FEEL SOME DISCOMFORT. ORDERS REVIEWED. DR. BALDWIN CALLED TO CLARIFY 
TORADOL D/C'D. ORDERS TO KEEP TORADOL D/C'D, GIVE LORTAB OR 1000MG TYLENOL IF PT DOES NOT 
WANT LORTAB. ORDER ALSO REC'D FOR 0.5MG DILAUDID IV FOR BREAKTHROUGH PAIN IF NEEDED. RN TO 
ROOM TO SEE WHICH MED PT PREFERS. PT SLEEPING SOUNDLY, NO S/S OF DISTRESS NOTED. WILL ALLOW 
TO REST FOR NOW.

## 2019-08-08 NOTE — NUR
VAG PACKING/VPAD CHECKED. VAG PACKING SATURATED BUT HAS NOT LEAKED ONTO VPAD. WILL CONTINUE 
TO MONITOR.

## 2019-08-08 NOTE — NUR
PT RETURNED TO ROOM 305 VIA BED ACCOMPANIED BY PACU STAFF. REPORT REC'D BEDSIDE FROM HELDER PARMAR RN. ASSESSMENT COMPLETED, VS TAKEN. IV FLUIDS TO PUMP. SCD'S ON AND ACTIVATED. FRESH ICE 
WATER PROVIDED. GREGORY CATHETER DRAINING CLEAR YELLOW URINE TO DEPENDENT DRAINAGE. VAG 
PACKING AND VPAD DRY.

## 2019-08-08 NOTE — DISCHARGE INST-WOMEN'S SERVICE
Discharge Inst-Women's Serv


Depart Medication/Instructions


New, Converted or Re-Newed RX:  RX on Chart


Problems Reviewed?:  Yes





Consults/Follow Up


Additional Follow Up:  Yes


Orders/Referrals


Dr. Baldwin in 4 weeks





Activity


Activity:  Activity as Tolerated


Driving Instructions:  You May Drive (do not drive while taking hydrocodone)


NO SMOKING:  NO SMOKING


Nothing Inside Vagina:  No Douching, No [place], No Tampons





Diet


Discharge Diet:  No Restrictions


Symptoms to Report to :  Bleeding Excessive, Pain Increased, Fever Over 101 

Degrees F, Vaginal Bleeding Increase, Questions/Concerns











YEYO BALDWIN DO             Aug 8, 2019 09:36

## 2019-08-08 NOTE — NUR
PT TO WS ROOM 305 VIA BED ACCOMPANIED BY PACU STAFF. REPORT REC'D FROM BRANDY GONZALEZ RN. 
ASSESSMENT COMPLETED, VS TAKEN. CALF SCD'S ON AND ACTIVATED. IV FLUIDS TO PUMP. PT AND 
FAMILY ORIENTED TO ROOM AND CALL LIGHT, ROOM SERVICE. IS AT BEDSIDE.

## 2019-08-08 NOTE — NUR
Patient appears asleep in bed, respiratory rate even and unlabored. Calf SCDs in place, 
caceres patent to dependant drainage. Will return for assessment.

## 2019-08-08 NOTE — PROGRESS NOTE
Standard Progress Note


Progress Notes/Assess & Plan


Date Seen by a Provider:  Aug 8, 2019


Time Seen by a Provider:  14:30


Progress/Assessment & Plan


Called to floor to evaluate patient due to bleeding noted.  Upon arrival vag 

packing is soaked, but patient stable otherwise.  Blood is dripping from 

packing, VSS and patient is without complaint.  Vaginal pad beneath patient is 

soaked as well.  Packing is then removed, and patient is medicated with 0.5 mg 

dilaudid, and repacked.  Which is soaked again before I leave the room.  

Discussed with the patient going back to the OR and addressing with revision of 

ligation of vaginal incision.  Consent was obtained, patient was then taken to 

the OR.  See operative note











YEYO BALDWIN DO            Aug 8, 2019 3:47 pm

## 2019-08-08 NOTE — OPERATIVE REPORT
DATE OF SERVICE:  



PREOPERATIVE DIAGNOSIS:

A 29-year-old female with anterior vaginal wall cyst.



POSTOPERATIVE DIAGNOSIS:

A 29-year-old female with anterior vaginal wall cyst.



PROCEDURE:

Excision anterior vaginal wall cyst.



SURGEON:

Yeyo Baldwin DO



ANESTHESIA:

General endotracheal.



ESTIMATED BLOOD LOSS:

100 mL.



URINE OUTPUT:

200 mL clear urine at the end of the procedure.



FLUIDS:

1400 mL of lactated Ringer's solution.



FINDINGS:

There is an approximately 3 x 4 cm mucin containing cyst of the submucosa of the

anterior vaginal wall.  Grossly normal-appearing external female genitalia

otherwise.



SPECIMENS SENT:

Anterior vaginal wall cyst.



INDICATIONS FOR PROCEDURE:

This 29-year-old female who had seen me in the office for significant amounts of

dyspareunia that was getting worse since having her child.  She was also feeling

some pelvic pressure.  On evaluation in the office, she initially thought that

she had a cystocele; however, further evaluation revealed no cystocele, however,

there was a bulge cystic in nature in the anterior vaginal wall that palpated to

be submucosal.  I did order a pelvic ultrasound, which confirmed the presence of

this cyst, which appeared simple in nature to delineate whether it was part of

the gynecologic or genitourinary system.  A CT voiding cystourethrogram was

ordered, which revealed no involvement of the urinary system, at which point I

discussed with the patient, monitoring this versus excisional biopsy.  Risks of

both were discussed.  She did end up monitored for about 4 weeks.  There was no

improvement and actually thought maybe it was getting a little bit worse. 

Therefore, we went forward with removal of this.  Risks of the procedure were

discussed with the patient in detail including risk of bleeding, infection,

damage to surrounding structures including, but not limited to the bladder

itself and the urethra.  Permanent scarring of the vagina was also discussed. 

After all of her questions were answered, consent was obtained in the

preoperative area and the patient was taken to the operating room.



OPERATIVE REPORT IN DETAIL:

Once in the operating room, anesthesia was found to be adequate, placed in

dorsal lithotomy position, prepped and draped in normal sterile fashion. 

Timeout was performed.  A Gore catheter was then placed first to help delineate

from the urethra from this vaginal mass.  The vaginal mass could be palpated

easily.  I infiltrated the submucosa of the superficial vaginal epithelium over

the top of the mass using 0.25% Marcaine with epinephrine.  Once the tissue

blanches, I made an incision down the midline and dissect the underlying

submucosa from the cyst, which is encountered at that point.  During my

dissection process, there was a rupture in the cyst _____ those port out that

appears to be clear, it is not purulent.  I bluntly dissected the cyst off of

the surrounding submucosal margins, which I do encounter some bleeding in the

process of doing so.  I end up recovering my dissection bed with FloSeal to help

with postoperative hemostasis.  I also closed the incision line using 3-0 Vicryl

suture in running locked fashion and then finally there is still a small amount

of bleeding noted from the incisional line.  Therefore, I packed the vagina

using vaginal packing and leave the Gore catheter in place.  We will leave this

in overnight and then remove in the morning.  All the other instruments were

removed from the patient's vagina.  Lap and sponge counts were correct at the

end of the procedure.  Instrument counts correct as well.





Job ID: [id number]

DocumentID: [id number]

Dictated Date:  08/08/2019 12:14:02

Transcription Date: 08/08/2019 22:50:31

Dictated By: YEYO BALDWIN DO

MTDADE

## 2019-08-09 VITALS — SYSTOLIC BLOOD PRESSURE: 78 MMHG | DIASTOLIC BLOOD PRESSURE: 56 MMHG

## 2019-08-09 VITALS — DIASTOLIC BLOOD PRESSURE: 59 MMHG | SYSTOLIC BLOOD PRESSURE: 88 MMHG

## 2019-08-09 VITALS — DIASTOLIC BLOOD PRESSURE: 52 MMHG | SYSTOLIC BLOOD PRESSURE: 88 MMHG

## 2019-08-09 NOTE — ANESTHESIA-GENERAL POST-OP
General


Patient Condition


Mental Status/LOC:  Same as Preop


Cardiovascular:  Satisfactory


Nausea/Vomiting:  Absent


Respiratory:  Satisfactory


Pain:  Controlled


Complications:  Absent





Post Op Complications


Complications


None





Follow Up Care/Instructions


Patient Instructions


None needed.





Anesthesia/Patient Condition


Patient Condition


Patient is doing well, no complaints, stable vital signs, no apparent adverse 

anesthesia problems.   


No complications reported per nursing.











DEBORA SPENCER CRNA           Aug 9, 2019 10:35

## 2019-08-09 NOTE — OPERATIVE REPORT
DATE OF SERVICE:  



PREOPERATIVE DIAGNOSIS:

A 29-year-old female with postoperative vaginal hemorrhage.



POSTOPERATIVE DIAGNOSIS:

A 29-year-old female with postoperative vaginal hemorrhage.



PROCEDURE:

Ligation of bleeding from the vaginal wound.



SURGEON:

Yeyo Baldwin DO.



ANESTHESIA:

General endotracheal.



ESTIMATED BLOOD LOSS:

200 mL.



URINE OUTPUT

20 mL, clear during the procedure.



FLUIDS:

1000 mL of lactated Ringer solution.



FINDINGS:

Bleeding from the proximal margin of the vaginal incision.



SPECIMENS SENT:

None.



INDICATIONS FOR PROCEDURE:

This 29-year-old female who was admitted earlier today after excision of what

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  After her

procedure, vaginal packing was applied.  She was kept overnight for observation

due to my concern with possible bleeding from this incision site.  I was

contacted by the nurses this afternoon saline and the vaginal packing had been

soaked and she had soaked through the vaginal pad beneath her.  I went to

evaluate the patient and repacking the patient after which the packing was

soaked again and blood within 5 minutes.  I discussed the patient went back to

the operating room to stop the bleeding.  Risks of the procedure were discussed

with the patient.  She was escorted to the operating room by myself and nursing

staff.



OPERATIVE REPORT IN DETAIL:

Once in the operating room, anesthesia was found to be adequate.  She was placed

in dorsal lithotomy position, prepped and draped in normal sterile fashion.  The

vaginal packing is removed and significant amount of blood clots approximately

60 to 80 mL of blood clots were removed from the vagina behind the packing.  A

weighted speculum was inserted in the patient's vagina.  Right angle retractor

is used to visualize the cervix to ensure there was no bleeding noted from the

cervix, which there is none.  After the bleeding is cleared from the vagina, I

irrigated using normal saline and I am able to identify a squirting bleeder from

the proximal incisional margin.  I overrun this with 3-0 Vicryl suture in

ligating fashion.  This takes several different attempts.  I end up running the

incision again with locking 3-0 Vicryl suture, which does result in hemostasis

of the previously bleeding area.  After which I evaluate for approximately 5

minutes watching for any further bleeding.  There is absolutely no bleeding

whatsoever noted from anywhere on the vaginal incision.  After that was done,

the wound was again irrigated using normal saline.  Once it was irrigated, I

repacked the vagina with two separate vaginal packing soaked in Premarin cream. 

Gore catheter was left in place.  The patient tolerated the procedure well and

sent to recovery area in stable condition.  Lap and sponge counts were correct

at the end of the procedure.  Instrument counts were correct as well.





Job ID: [id number]

DocumentID: [id number]

Dictated Date:  08/08/2019 15:53:07

Transcription Date: 08/09/2019 01:23:54

Dictated By: YEYO BALDWIN DO

## 2019-08-09 NOTE — NUR
Dr Sutherland stopped into see patient. Patient sleeping. Verbal orders received that if patient 
is able to void and bleeding remains minimal that patient may go home without being rounded 
on if she desires to go prior to Dr arriving at hospital later this morning.

## 2019-08-09 NOTE — NUR
DISCHARGE INSTRUCTIONS EXPLAINED TO PT WITH COPY PROVIDED TO PT ALONG WITH PRESCRIPTIONS. PT 
NOTIFIED OF FOLLOW UP APPT MADE FOR 4 WEEKS. PT VERBALIZES UNDERSTANDING OF INSTRUCTION, 
SIGNS TO VERIFY. DENIES QUESTIONS OR CONCERNS AT THIS TIME.

## 2019-08-09 NOTE — NUR
Patient vag packing and catheter removed, patient now ambulating up to bathroom with standby 
assist from this RN. Void of approx 25mL achieved. Fresh pad and underwear place on patient. 
Very scant bleeding noted to vag packing upon removal with no current active bleeding. 
Patient ambulated back to bed without difficulty. Instructed patient on how to order 
breakfast and to call next time she is wanting to get out of bed so that standby assist may 
be provided if she feels light headed. Patient voiced understanding. Call light remains 
within reach.

## 2019-08-09 NOTE — NUR
PT TAKEN OFF UNIT VIA WHEELCHAIR ACCOMPANIED BY OB STAFF. TO PRIVATE VEHICLE WITH ALL 
PERSONAL BELONGINGS. NO S/S OF DISTRESS.

## 2019-08-09 NOTE — NUR
TO ROOM FOR ASSESSMENT, PT VOIDED 300 ML CLEAR, YELLOW URINE WITHOUT DIFFICULTY. DESIRES TO 
GO HOME.